# Patient Record
Sex: MALE | Employment: FULL TIME | URBAN - NONMETROPOLITAN AREA
[De-identification: names, ages, dates, MRNs, and addresses within clinical notes are randomized per-mention and may not be internally consistent; named-entity substitution may affect disease eponyms.]

---

## 2024-06-28 ENCOUNTER — OFFICE VISIT (OUTPATIENT)
Age: 29
End: 2024-06-28
Payer: COMMERCIAL

## 2024-06-28 VITALS
HEART RATE: 70 BPM | HEIGHT: 67 IN | SYSTOLIC BLOOD PRESSURE: 140 MMHG | RESPIRATION RATE: 18 BRPM | DIASTOLIC BLOOD PRESSURE: 90 MMHG | OXYGEN SATURATION: 98 % | WEIGHT: 181.4 LBS | BODY MASS INDEX: 28.47 KG/M2

## 2024-06-28 DIAGNOSIS — E10.65 TYPE 1 DIABETES MELLITUS WITH HYPERGLYCEMIA (HCC): Primary | ICD-10-CM

## 2024-06-28 DIAGNOSIS — B35.3 TINEA PEDIS OF BOTH FEET: ICD-10-CM

## 2024-06-28 DIAGNOSIS — I10 PRIMARY HYPERTENSION: ICD-10-CM

## 2024-06-28 DIAGNOSIS — F51.01 PRIMARY INSOMNIA: ICD-10-CM

## 2024-06-28 DIAGNOSIS — E78.2 MIXED HYPERLIPIDEMIA: ICD-10-CM

## 2024-06-28 DIAGNOSIS — M72.2 PLANTAR FASCIITIS, BILATERAL: ICD-10-CM

## 2024-06-28 DIAGNOSIS — K21.9 GASTROESOPHAGEAL REFLUX DISEASE, UNSPECIFIED WHETHER ESOPHAGITIS PRESENT: ICD-10-CM

## 2024-06-28 PROCEDURE — 99204 OFFICE O/P NEW MOD 45 MIN: CPT | Performed by: GENERAL PRACTICE

## 2024-06-28 PROCEDURE — 3077F SYST BP >= 140 MM HG: CPT | Performed by: GENERAL PRACTICE

## 2024-06-28 PROCEDURE — 3080F DIAST BP >= 90 MM HG: CPT | Performed by: GENERAL PRACTICE

## 2024-06-28 RX ORDER — ROSUVASTATIN CALCIUM 20 MG/1
20 TABLET, COATED ORAL DAILY
COMMUNITY
Start: 2024-04-26

## 2024-06-28 RX ORDER — CLOTRIMAZOLE 1 %
CREAM (GRAM) TOPICAL
Qty: 65 G | Refills: 1 | Status: SHIPPED | OUTPATIENT
Start: 2024-06-28 | End: 2024-07-05

## 2024-06-28 RX ORDER — PROCHLORPERAZINE 25 MG/1
SUPPOSITORY RECTAL
COMMUNITY
Start: 2024-04-09

## 2024-06-28 RX ORDER — PANTOPRAZOLE SODIUM 40 MG/1
40 TABLET, DELAYED RELEASE ORAL DAILY
COMMUNITY
Start: 2024-04-26

## 2024-06-28 RX ORDER — INSULIN PMP CART,AUT,G6/7,CNTR
EACH SUBCUTANEOUS
COMMUNITY
Start: 2024-05-30

## 2024-06-28 RX ORDER — PROCHLORPERAZINE 25 MG/1
SUPPOSITORY RECTAL
COMMUNITY
Start: 2024-05-30

## 2024-06-28 RX ORDER — CANDESARTAN 16 MG/1
16 TABLET ORAL DAILY
Qty: 90 TABLET | Refills: 3 | Status: SHIPPED | OUTPATIENT
Start: 2024-06-28

## 2024-06-28 RX ORDER — INSULIN LISPRO 200 [IU]/ML
INJECTION, SOLUTION SUBCUTANEOUS
COMMUNITY

## 2024-06-28 RX ORDER — PROCHLORPERAZINE 25 MG/1
SUPPOSITORY RECTAL CONTINUOUS
COMMUNITY
Start: 2021-08-09

## 2024-06-28 SDOH — ECONOMIC STABILITY: HOUSING INSECURITY
IN THE LAST 12 MONTHS, WAS THERE A TIME WHEN YOU DID NOT HAVE A STEADY PLACE TO SLEEP OR SLEPT IN A SHELTER (INCLUDING NOW)?: NO

## 2024-06-28 SDOH — ECONOMIC STABILITY: INCOME INSECURITY: HOW HARD IS IT FOR YOU TO PAY FOR THE VERY BASICS LIKE FOOD, HOUSING, MEDICAL CARE, AND HEATING?: NOT VERY HARD

## 2024-06-28 SDOH — ECONOMIC STABILITY: FOOD INSECURITY: WITHIN THE PAST 12 MONTHS, YOU WORRIED THAT YOUR FOOD WOULD RUN OUT BEFORE YOU GOT MONEY TO BUY MORE.: SOMETIMES TRUE

## 2024-06-28 SDOH — ECONOMIC STABILITY: FOOD INSECURITY: WITHIN THE PAST 12 MONTHS, THE FOOD YOU BOUGHT JUST DIDN'T LAST AND YOU DIDN'T HAVE MONEY TO GET MORE.: SOMETIMES TRUE

## 2024-06-28 ASSESSMENT — ENCOUNTER SYMPTOMS
NAUSEA: 0
VOMITING: 0
CHEST TIGHTNESS: 0
STRIDOR: 0
BACK PAIN: 0
BLOOD IN STOOL: 0
COUGH: 0
SHORTNESS OF BREATH: 0
ABDOMINAL PAIN: 0

## 2024-06-28 ASSESSMENT — PATIENT HEALTH QUESTIONNAIRE - PHQ9
SUM OF ALL RESPONSES TO PHQ QUESTIONS 1-9: 0
SUM OF ALL RESPONSES TO PHQ QUESTIONS 1-9: 0
1. LITTLE INTEREST OR PLEASURE IN DOING THINGS: NOT AT ALL
SUM OF ALL RESPONSES TO PHQ QUESTIONS 1-9: 0
SUM OF ALL RESPONSES TO PHQ QUESTIONS 1-9: 0
2. FEELING DOWN, DEPRESSED OR HOPELESS: NOT AT ALL
SUM OF ALL RESPONSES TO PHQ9 QUESTIONS 1 & 2: 0

## 2024-06-28 NOTE — PROGRESS NOTES
Lonnie Galicia (:  1995) is a 29 y.o. male,New patient, here for evaluation of the following chief complaint(s):  New Patient       Assessment & Plan   ASSESSMENT/PLAN:  1. Type 1 diabetes mellitus with hyperglycemia (HCC)  Follows with endocrinology, refer to ophtho for eye exams. Gets foot exam and microalb testing from Endocrinology.Microalbuminuria on labs 2023.   - Ambulatory referral to Ophthalmology    2. Mixed hyperlipidemia  's, on crestor 20, has not achieved 30% reduction in LDL. Counseled on carb control.  - LIPID PANEL; Future    3. Gastroesophageal reflux disease, unspecified whether esophagitis present  Stable, cont PPI    4. Primary insomnia  Stable, cont melatonin    5. Primary hypertension  Above goal, check BP at home. Goal 130/80 for HL. Start at half dose ARB, recheck BP 1-2x weekly.  - candesartan (ATACAND) 16 MG tablet; Take 1 tablet by mouth daily  Dispense: 90 tablet; Refill: 3    6. Plantar fasciitis, bilateral  Recommend HEP and stretching    7. Tinea pedis of both feet  Start clotrimazole BID  - clotrimazole (LOTRIMIN AF) 1 % cream; Apply topically 2 times daily.  Dispense: 65 g; Refill: 1        Return in about 6 months (around 2024) for Interval follow-up, Labs 1 week prior to appointment.         Subjective   SUBJECTIVE/OBJECTIVE:  HPI  Mr. Galicia is a 30y/o M, no former PCP, who presents to establish care.    PMHx  HL () on crestor 20mg  DM1 w/ hyperglycemia and microalbuminuria (7.8% A1c 2024)  Insomnia on melatonin 5mg  GERD on protonix    #Akbar Nichols at [facility] manages pumps, sensors, etc. Recently increased to U200. A1c in the mid 7's per patient. Microalbuminuria on lab review 2023.Needs referred to [facility] for yearly screening. Diet rich in pasta and potatoes.  # (Peak 187) increasing crestor 5mg to 20mg.    #FMLA with [employer] for doctor visits one day per month.    Going through divorce, has a new GF.    Review of

## 2024-06-28 NOTE — PROGRESS NOTES
Patient prev pcp was in [place] and then [facility].     Patient does have a endocrinologist in [place].     Patient would like [facility] for his job in case he misses due to diabetic episode.

## 2024-12-23 ENCOUNTER — OFFICE VISIT (OUTPATIENT)
Age: 29
End: 2024-12-23
Payer: COMMERCIAL

## 2024-12-23 VITALS
HEIGHT: 67 IN | DIASTOLIC BLOOD PRESSURE: 86 MMHG | SYSTOLIC BLOOD PRESSURE: 130 MMHG | BODY MASS INDEX: 28.88 KG/M2 | OXYGEN SATURATION: 99 % | RESPIRATION RATE: 18 BRPM | HEART RATE: 70 BPM | WEIGHT: 184 LBS

## 2024-12-23 DIAGNOSIS — I10 PRIMARY HYPERTENSION: ICD-10-CM

## 2024-12-23 DIAGNOSIS — F51.01 PRIMARY INSOMNIA: ICD-10-CM

## 2024-12-23 DIAGNOSIS — E10.65 TYPE 1 DIABETES MELLITUS WITH HYPERGLYCEMIA (HCC): Primary | ICD-10-CM

## 2024-12-23 LAB
ESTIMATED AVERAGE GLUCOSE: NORMAL
HBA1C MFR BLD: 9.2 %

## 2024-12-23 PROCEDURE — 3075F SYST BP GE 130 - 139MM HG: CPT | Performed by: GENERAL PRACTICE

## 2024-12-23 PROCEDURE — 3046F HEMOGLOBIN A1C LEVEL >9.0%: CPT | Performed by: GENERAL PRACTICE

## 2024-12-23 PROCEDURE — 99214 OFFICE O/P EST MOD 30 MIN: CPT | Performed by: GENERAL PRACTICE

## 2024-12-23 PROCEDURE — 3079F DIAST BP 80-89 MM HG: CPT | Performed by: GENERAL PRACTICE

## 2024-12-23 RX ORDER — HYDROXYZINE PAMOATE 50 MG/1
50 CAPSULE ORAL NIGHTLY
Qty: 90 CAPSULE | Refills: 0 | Status: SHIPPED | OUTPATIENT
Start: 2024-12-23 | End: 2025-02-21

## 2024-12-23 RX ORDER — ACYCLOVIR 400 MG/1
TABLET ORAL
COMMUNITY

## 2024-12-23 RX ORDER — CANDESARTAN 32 MG/1
32 TABLET ORAL DAILY
Qty: 90 TABLET | Refills: 1 | Status: SHIPPED | OUTPATIENT
Start: 2024-12-23

## 2024-12-23 RX ORDER — DOXEPIN HYDROCHLORIDE 10 MG/1
10 CAPSULE ORAL NIGHTLY
Qty: 90 CAPSULE | Refills: 0 | Status: SHIPPED | OUTPATIENT
Start: 2024-12-23

## 2024-12-23 RX ORDER — INSULIN PMP CART,AUT,G6/7,CNTR
EACH SUBCUTANEOUS
COMMUNITY

## 2024-12-23 NOTE — PROGRESS NOTES
Lonnie Galicia (:  1995) is a 29 y.o. male,Established patient, here for evaluation of the following chief complaint(s):  6 Month Follow-Up       Assessment & Plan   ASSESSMENT/PLAN:  1. Type 1 diabetes mellitus with hyperglycemia (HCC)  Stable, f/u with [facility]. Obtain microlab/cr ratio. A1c reviewed from recent OSH labs.  - Albumin/Creatinine Ratio, Urine    2. Primary hypertension  Above goal, increase ARB to 32mg.  - candesartan (ATACAND) 32 MG tablet; Take 1 tablet by mouth daily  Dispense: 90 tablet; Refill: 1    3. Primary insomnia  Start doxepin, start vistaril. Encourage sleep hygiene.  - doxepin (SINEQUAN) 10 MG capsule; Take 1 capsule by mouth nightly  Dispense: 90 capsule; Refill: 0  - hydrOXYzine pamoate (VISTARIL) 50 MG capsule; Take 1 capsule by mouth at bedtime  Dispense: 90 capsule; Refill: 0      Return in about 3 months (around 3/23/2025) for ok for virtual.         Subjective   SUBJECTIVE/OBJECTIVE:  HPI  Mr. Galicia is a 30y/o M, no former PCP, who presents to establish care.    PMHx  HL () on crestor 20mg  DM1 w/ hyperglycemia and microalbuminuria (9.2% A1c 2024)  Insomnia on melatonin 5mg  GERD on protonix    #Akbar Nichols at [facility] Diabetes manages pumps, sensors, etc. Recently increased to U200. A1c in the mid 7's per patient. Microalbuminuria on lab review 2023.Needs referred to [facility] for yearly screening. Diet rich in pasta and potatoes.  # (Peak 187) increasing crestor 5mg to 20mg.    [facility] per [facility].    Going through divorce, has a new GF who broke up with him . Furloughed for 30d from [employer] (-)  History of Present Illness      Review of Systems   All other systems reviewed and are negative.         Objective   /86 (Site: Left Upper Arm, Position: Sitting, Cuff Size: Large Adult)   Pulse 70   Resp 18   Ht 1.702 m (5' 7\")   Wt 83.5 kg (184 lb)   SpO2 99%   BMI 28.82

## 2024-12-24 LAB
CREAT UR-MCNC: 61.3 MG/DL (ref 39–259)
MICROALBUMIN UR DL<=1MG/L-MCNC: <1.2 MG/DL
MICROALBUMIN/CREAT UR: NORMAL MG/G (ref 0–30)

## 2025-04-14 DIAGNOSIS — F51.01 PRIMARY INSOMNIA: ICD-10-CM

## 2025-04-15 RX ORDER — HYDROXYZINE PAMOATE 50 MG/1
CAPSULE ORAL
Qty: 90 CAPSULE | Refills: 0 | Status: SHIPPED | OUTPATIENT
Start: 2025-04-15

## 2025-04-23 ENCOUNTER — OFFICE VISIT (OUTPATIENT)
Age: 30
End: 2025-04-23
Payer: COMMERCIAL

## 2025-04-23 VITALS
WEIGHT: 188.2 LBS | HEART RATE: 90 BPM | BODY MASS INDEX: 29.54 KG/M2 | RESPIRATION RATE: 20 BRPM | DIASTOLIC BLOOD PRESSURE: 84 MMHG | OXYGEN SATURATION: 97 % | SYSTOLIC BLOOD PRESSURE: 122 MMHG | HEIGHT: 67 IN

## 2025-04-23 DIAGNOSIS — F51.01 PRIMARY INSOMNIA: ICD-10-CM

## 2025-04-23 DIAGNOSIS — I10 PRIMARY HYPERTENSION: ICD-10-CM

## 2025-04-23 DIAGNOSIS — Z00.00 ANNUAL PHYSICAL EXAM: Primary | ICD-10-CM

## 2025-04-23 DIAGNOSIS — D22.39 NEVUS OF NOSE: ICD-10-CM

## 2025-04-23 DIAGNOSIS — E10.65 TYPE 1 DIABETES MELLITUS WITH HYPERGLYCEMIA (HCC): ICD-10-CM

## 2025-04-23 DIAGNOSIS — E78.2 MIXED HYPERLIPIDEMIA: ICD-10-CM

## 2025-04-23 PROCEDURE — 99395 PREV VISIT EST AGE 18-39: CPT | Performed by: GENERAL PRACTICE

## 2025-04-23 PROCEDURE — 3079F DIAST BP 80-89 MM HG: CPT | Performed by: GENERAL PRACTICE

## 2025-04-23 PROCEDURE — 99213 OFFICE O/P EST LOW 20 MIN: CPT | Performed by: GENERAL PRACTICE

## 2025-04-23 PROCEDURE — 3074F SYST BP LT 130 MM HG: CPT | Performed by: GENERAL PRACTICE

## 2025-04-23 RX ORDER — DOXEPIN 3 MG/1
3 TABLET, FILM COATED ORAL NIGHTLY
Qty: 70 TABLET | Refills: 3 | Status: SHIPPED | OUTPATIENT
Start: 2025-04-23 | End: 2026-04-23

## 2025-04-23 RX ORDER — DOXEPIN HYDROCHLORIDE 10 MG/1
10 CAPSULE ORAL NIGHTLY
Qty: 25 CAPSULE | Refills: 3 | Status: SHIPPED | OUTPATIENT
Start: 2025-04-23

## 2025-04-23 RX ORDER — INSULIN PMP CART,AUT,G6/7,CNTR
EACH SUBCUTANEOUS
COMMUNITY
Start: 2025-04-20

## 2025-04-23 RX ORDER — HYDROXYZINE HYDROCHLORIDE 50 MG/1
50 TABLET, FILM COATED ORAL NIGHTLY
Qty: 90 TABLET | Refills: 3 | Status: SHIPPED | OUTPATIENT
Start: 2025-04-23 | End: 2026-04-18

## 2025-04-23 NOTE — PROGRESS NOTES
2025    Lonnie Galicia (:  1995) is a 29 y.o. male, here for a preventive medicine evaluation.    PMHx  HL () on crestor 20mg  DM1 w/ hyperglycemia and microalbuminuria (9.2% A1c 2024)  Insomnia on melatonin 5mg  GERD on protonix    #Akbar Nichols at [facility] Diabetes manages pumps, sensors, etc. Recently increased to U200. A1c in the mid 7's per patient. Microalbuminuria on lab review 2023.Needs referred to [facility] for yearly screening. Diet rich in pasta and potatoes.  # (Peak 187) on crestor 20mg.    #FMLA with [employer] for doctor visits one day per month.    Subjective   Patient Active Problem List   Diagnosis    Type 1 diabetes mellitus with hyperglycemia (HCC)    Mixed hyperlipidemia    Gastroesophageal reflux disease    Primary insomnia    Primary hypertension    Plantar fasciitis, bilateral     History of Present Illness  The patient presents for evaluation of sleep issues.    Sleep quality has improved, although difficulty initiating sleep persists. He attributes this to the long-term use of ADHD medications during childhood, which may have altered brain wiring. Supplementation with vitamins has positively affected sleep latency, reducing it from 2 to 3 hours to approximately 1 hour. Vistaril is taken for calming nerves but does not aid in sleep induction. Employed full-time, he works 5 days a week from Monday to Friday. Doxepin is prescribed for sleep induction, effective but reserved for weekends due to potent sedative effects.    Crestor is taken for cholesterol management and pantoprazole as needed for acid reflux. Blood work has been done, but a urine sample has not been taken in a while. A follow-up appointment is scheduled for 2025 or 2025.    Review of Systems   All other systems reviewed and are negative.      Prior to Visit Medications    Medication Sig Taking? Authorizing Provider   Insulin Disposable Pump (OMNIPOD 5 [id number] PODS GEN 5) MISC

## 2025-04-23 NOTE — ASSESSMENT & PLAN NOTE
2nd doxepin script for 3mg night of work, 10mg on weekends. If 3mg effective at maintaining sleep all days, can change script to only 3mg. D/c vistaril, start atarax to help induce sleep.    Orders:    doxepin (SINEQUAN) 10 MG capsule; Take 1 capsule by mouth nightly    doxepin (SILENOR) 3 MG TABS tablet; Take 1 tablet by mouth nightly On work nights    hydrOXYzine HCl (ATARAX) 50 MG tablet; Take 1 tablet by mouth nightly

## 2025-07-11 DIAGNOSIS — E10.65 TYPE 1 DIABETES MELLITUS WITH HYPERGLYCEMIA (HCC): ICD-10-CM

## 2025-07-11 DIAGNOSIS — E78.2 MIXED HYPERLIPIDEMIA: Primary | ICD-10-CM

## 2025-07-11 DIAGNOSIS — I10 PRIMARY HYPERTENSION: ICD-10-CM

## 2025-07-14 NOTE — TELEPHONE ENCOUNTER
ARB Refill Protocol [id number] 08:48 PM   Protocol Details Last creatinine level resulted within the past 12 months    Last potassium level normal, within the past 12 months       Medication:   Requested Prescriptions     Pending Prescriptions Disp Refills    candesartan (ATACAND) 32 MG tablet 90 tablet 1     Sig: Take 1 tablet by mouth daily        Last Filled:  12/23/2024 - protocol failed - labs ordered - my chart message sent to patient to complete labs    Patient Phone Number: [phone] (home) [phone] (work)    Last appt: 4/23/2025   Next appt: patient is to return in 1 year for annual> no appt made as of yet    Last OARRS:        No data to display

## 2025-07-15 RX ORDER — CANDESARTAN 32 MG/1
32 TABLET ORAL DAILY
Qty: 90 TABLET | Refills: 1 | Status: SHIPPED | OUTPATIENT
Start: 2025-07-15